# Patient Record
Sex: FEMALE | Race: WHITE | NOT HISPANIC OR LATINO | Employment: UNEMPLOYED | URBAN - METROPOLITAN AREA
[De-identification: names, ages, dates, MRNs, and addresses within clinical notes are randomized per-mention and may not be internally consistent; named-entity substitution may affect disease eponyms.]

---

## 2018-03-22 ENCOUNTER — OFFICE VISIT (OUTPATIENT)
Dept: ENDOCRINOLOGY | Facility: CLINIC | Age: 53
End: 2018-03-22
Payer: MEDICAID

## 2018-03-22 ENCOUNTER — TELEPHONE (OUTPATIENT)
Dept: ENDOCRINOLOGY | Facility: CLINIC | Age: 53
End: 2018-03-22

## 2018-03-22 VITALS
HEIGHT: 63 IN | DIASTOLIC BLOOD PRESSURE: 74 MMHG | SYSTOLIC BLOOD PRESSURE: 124 MMHG | WEIGHT: 203.5 LBS | BODY MASS INDEX: 36.06 KG/M2 | RESPIRATION RATE: 17 BRPM | HEART RATE: 78 BPM

## 2018-03-22 DIAGNOSIS — F41.9 ANXIETY: ICD-10-CM

## 2018-03-22 DIAGNOSIS — E66.9 OBESITY (BMI 35.0-39.9 WITHOUT COMORBIDITY): ICD-10-CM

## 2018-03-22 DIAGNOSIS — E89.40 SURGICAL MENOPAUSE: ICD-10-CM

## 2018-03-22 DIAGNOSIS — E06.3 HYPOTHYROIDISM DUE TO HASHIMOTO'S THYROIDITIS: Primary | ICD-10-CM

## 2018-03-22 DIAGNOSIS — E03.8 HYPOTHYROIDISM DUE TO HASHIMOTO'S THYROIDITIS: Primary | ICD-10-CM

## 2018-03-22 DIAGNOSIS — E03.8 HYPOTHYROIDISM DUE TO HASHIMOTO'S THYROIDITIS: ICD-10-CM

## 2018-03-22 DIAGNOSIS — E06.3 HYPOTHYROIDISM DUE TO HASHIMOTO'S THYROIDITIS: ICD-10-CM

## 2018-03-22 PROCEDURE — 99999 PR PBB SHADOW E&M-NEW PATIENT-LVL III: CPT | Mod: PBBFAC,,, | Performed by: INTERNAL MEDICINE

## 2018-03-22 PROCEDURE — 99204 OFFICE O/P NEW MOD 45 MIN: CPT | Mod: S$PBB,,, | Performed by: INTERNAL MEDICINE

## 2018-03-22 PROCEDURE — 99203 OFFICE O/P NEW LOW 30 MIN: CPT | Mod: PBBFAC | Performed by: INTERNAL MEDICINE

## 2018-03-22 RX ORDER — LEVOTHYROXINE SODIUM 112 UG/1
112 TABLET ORAL
Qty: 30 TABLET | Refills: 6 | Status: SHIPPED | OUTPATIENT
Start: 2018-03-22 | End: 2018-05-04 | Stop reason: DRUGHIGH

## 2018-03-22 RX ORDER — LEVOTHYROXINE SODIUM 100 UG/1
100 TABLET ORAL
Qty: 30 TABLET | Refills: 11
Start: 2018-03-22 | End: 2018-03-22 | Stop reason: DRUGHIGH

## 2018-03-22 NOTE — LETTER
March 22, 2018      Bindu Carver, NP  [address] [place] [facility] [place] [place] [address]           [facility]  [address]  [facility] [facility] [address]  Phone: [phone]  Fax: [phone]          Patient: Alexsandra Apple   MR Number: [id number]   YOB: 1965   Date of Visit: 3/22/2018       Dear Bindu Carver:    Thank you for referring Alexsandra Apple to me for evaluation. Attached you will find relevant portions of my assessment and plan of care.    If you have questions, please do not hesitate to call me. I look forward to following Alexsandra Apple along with you.    Sincerely,    Capri Wyatt, RANCHO    Enclosure  CC:  No Recipients    If you would like to receive this communication electronically, please contact [email or web address] or [phone] to request more information on [employer] access.    For providers and/or their staff who would like to refer a patient to [facility], please contact us through our one-stop-shop provider referral line, [facility], at [phone].    If you feel you have received this communication in error or would no longer like to receive these types of communications, please e-mail [email or web address]

## 2018-03-22 NOTE — PROGRESS NOTES
Chief Complaint: Consult      HPI:   Alexsandra Apple is 52 y.o. female with preexisting thyroid disease. Referred by [facility]     Found to abnormal TFTs on routine labs in 09/2017 and was found to be hypothyroid    Current Medications: Levothyroxine 100 mcg once daily     Patient reports good compliance with taking medication as prescribed   Takes LT4 separate from food and other medications first thing in the morning. Pt reports waiting 45mins - 1 hour before eating or taking other meds.     Current  Symptoms:   Reports feeling tired.   + Constipation   + cold intolerance   +weight gain of 30 pounds   Dry skin   Brittle nails and hair     Family history of thyroid disease: Yes, sister had thyroid cancer at age 16     Patient reports hysterectomy at age 18. Reports diagnosis ovarian cancer  Review of Systems   Constitutional: Positive for fatigue and unexpected weight change.   HENT: Positive for trouble swallowing. Negative for sore throat and voice change.    Eyes: Negative for visual disturbance.   Respiratory: Negative for cough and shortness of breath.    Cardiovascular: Positive for chest pain (related to anxiety ) and palpitations (occasional ).   Gastrointestinal: Positive for constipation. Negative for abdominal pain, diarrhea, nausea and vomiting.   Endocrine: Positive for cold intolerance. Negative for heat intolerance, polydipsia and polyuria.   Genitourinary: Negative for dysuria.   Musculoskeletal: Negative for neck pain and neck stiffness.   Skin: Negative for rash.   Allergic/Immunologic: Negative for immunocompromised state.   Neurological: Negative for tremors and headaches.   Hematological: Does not bruise/bleed easily.   Psychiatric/Behavioral: Positive for decreased concentration. The patient is nervous/anxious.      Physical Exam   Constitutional: She is oriented to person, place, and time. She appears well-developed. No distress.   HENT:   Right Ear: External ear normal.   Left Ear:  External ear normal.   Nose: Nose normal.   Hearing normal  Dentition good    Neck: No tracheal deviation present. Thyromegaly present.   Thyroid firm on palpation. Possible right thyroid nodule    Cardiovascular: Normal rate and regular rhythm.    No murmur heard.  Pulmonary/Chest: Effort normal and breath sounds normal.   Abdominal: Soft. There is no tenderness. No hernia.   Musculoskeletal: She exhibits no edema.   Gait normal  No clubbing or cyanosis noted   Lymphadenopathy:     She has no cervical adenopathy.   Neurological: She is alert and oriented to person, place, and time. She displays normal reflexes. No cranial nerve deficit.   Skin: Skin is warm and dry. No rash noted.   No nodules       Psychiatric: She has a normal mood and affect. Judgment normal.   Teary eyed    Nursing note and vitals reviewed.      LABS:   Outside labs from [facility]:   9/18/2017:   TSH - 81.7.00 ( 0.450-4.500)   T4 - 5.0 ug/dL ( 4.5-12.0)   T3 - 57 ng/dL ( )   Vitamin D - 23.9 ng/mL ( 30.0-100)     10/06/2017  TSH - 15.090 uIU/mL   Free T4 - 0.97 ( 0.82-1.77)   Thyroglobulin antibody - 920.2 IU/mL ( 0.0-0.9)     01/26/2018:   TSH - 7.690 uIU/mL ( 0.450-4.50 )   T4 - 4.6 ug/dL ( 4.5-12.0)   T3 ng/dL ( )       Assessment and Plan:  1. Hypothyroidism due to Hashimoto's thyroiditis  -- clinically and biochemically hypothyroid   -- goal of therapy is a normal TSH   -- check TFTs today   -- will adjust dosage accordingly   -- reviewed usual times of thyroid hormone changes   -- avoid exogenous hyperthyroidism as this can accelerate bone loss and increase risk of CV complications   -- obtain thyroid USS - will call with results   -     levothyroxine (SYNTHROID) 100 MCG tablet; Take 1 tablet (100 mcg total) by mouth before breakfast.  Dispense: 30 tablet; Refill: 11  -     TSH; Future; Expected date: 03/22/2018  -     Comprehensive metabolic panel; Future; Expected date: 03/22/2018  -     US Soft Tissue Head Neck Thyroid;  Future; Expected date: 03/22/2018    2. Anxiety  -- stable with current regimen   -- followed by outside PCP     3. Obesity (BMI 35.0-39.9 without comorbidity)  -- alerted as to the increased risk of T2DM   -- check A1c   -- encouraged dietary and lifestyle modifications   -- increase exercise as tolerated   -     Hemoglobin A1c; Future; Expected date: 03/22/2018    4. Surgical menopause  -- will check BMD as patient had a hysterectomy at age 18  -- replete vitamin D   -- start over the counter vitamin D3 - 2,000 IU's once daily   -- calcium from food sources are preferred   -     DXA Bone Density Spine And Hip; Future; Expected date: 03/22/2018          Case discussed in consultation with Dr. Olsen   Recommendations were discussed with the patient in detail  The patient verbalized understanding and agrees with the plan outlined as above.

## 2018-03-22 NOTE — TELEPHONE ENCOUNTER
Patient notified of test results. She did not answer. I recorded a voicemail message informing the patient of test results. TSH is elevated. Will increase Levothyroxine to 112 mcg once daily and will check labs again in 4-6 weeks   Will send new Rx to pharmacy on file. Office number provided

## 2018-03-23 NOTE — TELEPHONE ENCOUNTER
TSH lab scheduled for 5/4/18 as ordered per Capri Wyatt NP- appointment reminder letter mailed to address on file.

## 2018-04-03 ENCOUNTER — TELEPHONE (OUTPATIENT)
Dept: ENDOCRINOLOGY | Facility: CLINIC | Age: 53
End: 2018-04-03

## 2018-04-03 ENCOUNTER — HOSPITAL ENCOUNTER (OUTPATIENT)
Dept: RADIOLOGY | Facility: HOSPITAL | Age: 53
Discharge: HOME | End: 2018-04-03
Attending: INTERNAL MEDICINE
Payer: MEDICAID

## 2018-04-03 ENCOUNTER — HOSPITAL ENCOUNTER (OUTPATIENT)
Dept: RADIOLOGY | Facility: CLINIC | Age: 53
Discharge: HOME | End: 2018-04-03
Attending: INTERNAL MEDICINE
Payer: MEDICAID

## 2018-04-03 DIAGNOSIS — E06.3 HYPOTHYROIDISM DUE TO HASHIMOTO'S THYROIDITIS: ICD-10-CM

## 2018-04-03 DIAGNOSIS — E89.40 SURGICAL MENOPAUSE: ICD-10-CM

## 2018-04-03 DIAGNOSIS — E03.8 HYPOTHYROIDISM DUE TO HASHIMOTO'S THYROIDITIS: ICD-10-CM

## 2018-04-03 PROCEDURE — 77080 DXA BONE DENSITY AXIAL: CPT | Mod: 26,,, | Performed by: INTERNAL MEDICINE

## 2018-04-03 PROCEDURE — 76536 US EXAM OF HEAD AND NECK: CPT | Mod: TC

## 2018-04-03 PROCEDURE — 77080 DXA BONE DENSITY AXIAL: CPT | Mod: TC

## 2018-04-03 PROCEDURE — 76536 US EXAM OF HEAD AND NECK: CPT | Mod: 26,,, | Performed by: RADIOLOGY

## 2018-04-13 ENCOUNTER — TELEPHONE (OUTPATIENT)
Dept: ENDOCRINOLOGY | Facility: CLINIC | Age: 53
End: 2018-04-13

## 2018-04-17 ENCOUNTER — TELEPHONE (OUTPATIENT)
Dept: ENDOCRINOLOGY | Facility: CLINIC | Age: 53
End: 2018-04-17

## 2018-04-17 NOTE — TELEPHONE ENCOUNTER
----- Message from Sangeetha Roche sent at 4/16/2018 10:59 AM CDT -----  Contact: Self  Requesting info of her lab work on 4/3.

## 2018-04-20 ENCOUNTER — TELEPHONE (OUTPATIENT)
Dept: ENDOCRINOLOGY | Facility: CLINIC | Age: 53
End: 2018-04-20

## 2018-04-20 DIAGNOSIS — E03.8 HYPOTHYROIDISM DUE TO HASHIMOTO'S THYROIDITIS: Primary | ICD-10-CM

## 2018-04-20 DIAGNOSIS — E06.3 HYPOTHYROIDISM DUE TO HASHIMOTO'S THYROIDITIS: Primary | ICD-10-CM

## 2018-04-20 NOTE — TELEPHONE ENCOUNTER
Patient notified via phone. She did not answer again. I recorded a voicmail message with TSH results, thyroid USS and BMD results. Informed that we increased Levothyroxine to 112 mcg and that I sent in a new Rx to her pharmacy on file in March 2018. Patient advised to call back to arrange repeat labs. Office number provided

## 2018-04-20 NOTE — TELEPHONE ENCOUNTER
----- Message from Mikayla Ibarra sent at 4/20/2018  8:54 AM CDT -----  Contact: Self [phone]   PT called for test results. pls leave message if no answer.

## 2018-05-04 ENCOUNTER — LAB VISIT (OUTPATIENT)
Dept: LAB | Facility: HOSPITAL | Age: 53
End: 2018-05-04
Payer: MEDICAID

## 2018-05-04 ENCOUNTER — TELEPHONE (OUTPATIENT)
Dept: ENDOCRINOLOGY | Facility: CLINIC | Age: 53
End: 2018-05-04

## 2018-05-04 DIAGNOSIS — E06.3 HYPOTHYROIDISM DUE TO HASHIMOTO'S THYROIDITIS: ICD-10-CM

## 2018-05-04 DIAGNOSIS — E06.3 HYPOTHYROIDISM DUE TO HASHIMOTO'S THYROIDITIS: Primary | ICD-10-CM

## 2018-05-04 DIAGNOSIS — E03.8 HYPOTHYROIDISM DUE TO HASHIMOTO'S THYROIDITIS: ICD-10-CM

## 2018-05-04 DIAGNOSIS — E03.8 HYPOTHYROIDISM DUE TO HASHIMOTO'S THYROIDITIS: Primary | ICD-10-CM

## 2018-05-04 LAB
T4 FREE SERPL-MCNC: 1.06 NG/DL
TSH SERPL DL<=0.005 MIU/L-ACNC: 15.93 UIU/ML

## 2018-05-04 PROCEDURE — 84439 ASSAY OF FREE THYROXINE: CPT

## 2018-05-04 PROCEDURE — 36415 COLL VENOUS BLD VENIPUNCTURE: CPT

## 2018-05-04 PROCEDURE — 84443 ASSAY THYROID STIM HORMONE: CPT

## 2018-05-04 RX ORDER — LEVOTHYROXINE SODIUM 125 UG/1
125 TABLET ORAL
Qty: 30 TABLET | Refills: 6 | Status: SHIPPED | OUTPATIENT
Start: 2018-05-04 | End: 2018-11-26 | Stop reason: SDUPTHER

## 2018-05-04 NOTE — TELEPHONE ENCOUNTER
----- Message from Mikayla Ibarra sent at 5/4/2018  3:43 PM CDT -----  Contact: Self [phone]  PT returned call.     She is taking levothyroxine (SYNTHROID) 112 MCG tablet & a half.

## 2018-05-24 ENCOUNTER — TELEPHONE (OUTPATIENT)
Dept: ENDOCRINOLOGY | Facility: CLINIC | Age: 53
End: 2018-05-24

## 2018-05-24 NOTE — TELEPHONE ENCOUNTER
----- Message from Jennifer Moss MA sent at 5/24/2018  3:36 PM CDT -----  Contact: Alexsandra   tel:   [phone]      ----- Message -----  From: Maria Guadalupe Jenni  Sent: 5/24/2018   3:25 PM  To: Edmundo Farooq Staff    Pt.says she needs to speak to Ms. Edmundo ref. Her pcp told her to call you and get another test ordered when she has her labs on 6/20, cortisol challenge.    Pls call ref. This.

## 2018-05-25 ENCOUNTER — TELEPHONE (OUTPATIENT)
Dept: ENDOCRINOLOGY | Facility: CLINIC | Age: 53
End: 2018-05-25

## 2018-05-25 NOTE — TELEPHONE ENCOUNTER
Left voicemail message that Capri would like for her to schedule a follow up visit to discuss in detail. Provided call back number

## 2018-05-25 NOTE — TELEPHONE ENCOUNTER
----- Message from Sangeetha Roche sent at 5/25/2018 10:33 AM CDT -----  Contact: Self- [phone]  Pt is returning your call about an appt.

## 2018-06-04 ENCOUNTER — OFFICE VISIT (OUTPATIENT)
Dept: ENDOCRINOLOGY | Facility: CLINIC | Age: 53
End: 2018-06-04
Payer: MEDICAID

## 2018-06-04 VITALS
HEART RATE: 78 BPM | DIASTOLIC BLOOD PRESSURE: 74 MMHG | WEIGHT: 195.31 LBS | SYSTOLIC BLOOD PRESSURE: 121 MMHG | HEIGHT: 63 IN | BODY MASS INDEX: 34.61 KG/M2

## 2018-06-04 DIAGNOSIS — E03.8 HYPOTHYROIDISM DUE TO HASHIMOTO'S THYROIDITIS: Primary | ICD-10-CM

## 2018-06-04 DIAGNOSIS — E66.9 OBESITY (BMI 35.0-39.9 WITHOUT COMORBIDITY): ICD-10-CM

## 2018-06-04 DIAGNOSIS — E06.3 HYPOTHYROIDISM DUE TO HASHIMOTO'S THYROIDITIS: Primary | ICD-10-CM

## 2018-06-04 DIAGNOSIS — F33.0 MILD EPISODE OF RECURRENT MAJOR DEPRESSIVE DISORDER: ICD-10-CM

## 2018-06-04 DIAGNOSIS — M85.89 OSTEOPENIA OF MULTIPLE SITES: ICD-10-CM

## 2018-06-04 DIAGNOSIS — M79.2 NERVE PAIN: ICD-10-CM

## 2018-06-04 DIAGNOSIS — R53.82 CHRONIC FATIGUE: ICD-10-CM

## 2018-06-04 PROBLEM — F33.9 RECURRENT MAJOR DEPRESSIVE DISORDER: Status: ACTIVE | Noted: 2018-06-04

## 2018-06-04 PROBLEM — R53.83 FATIGUE: Status: ACTIVE | Noted: 2018-06-04

## 2018-06-04 PROCEDURE — 99999 PR PBB SHADOW E&M-EST. PATIENT-LVL III: CPT | Mod: PBBFAC,,, | Performed by: INTERNAL MEDICINE

## 2018-06-04 PROCEDURE — 99214 OFFICE O/P EST MOD 30 MIN: CPT | Mod: S$PBB,,, | Performed by: INTERNAL MEDICINE

## 2018-06-04 PROCEDURE — 99213 OFFICE O/P EST LOW 20 MIN: CPT | Mod: PBBFAC | Performed by: INTERNAL MEDICINE

## 2018-06-04 RX ORDER — ARIPIPRAZOLE 5 MG/1
5 TABLET ORAL DAILY
Qty: 30 TABLET | Refills: 0 | Status: SHIPPED | OUTPATIENT
Start: 2018-06-04 | End: 2019-06-04

## 2018-06-04 RX ORDER — GABAPENTIN 600 MG/1
600 TABLET ORAL 3 TIMES DAILY
Qty: 90 TABLET | Refills: 0 | Status: SHIPPED | OUTPATIENT
Start: 2018-06-04 | End: 2019-06-04

## 2018-06-04 NOTE — PROGRESS NOTES
Chief Complaint: Hypothyroidism       HPI:   Alexsandra Apple is 52 y.o. female with preexisting thyroid disease. Referred by [facility]. She is here today for routine follow up visit     Found to abnormal TFTs on routine labs in 09/2017 and was found to be hypothyroid due to Hashimoto's thyroiditis     In May 2018, we increased Levothyroxine to 125 mcg once daily due to an elevated TSH     Current regimen:   Levothyroxine 125 mcg once daily     Patient endorses compliance with taking medication as prescribed   Takes LT4 separate from food and other medications first thing in the morning. Pt reports waiting 45mins - 1 hour before eating or taking other meds.     She reports that her father passed away on May 31, 2018 and she is just returning from [place]     She continues to endorse fatigue and dry skin     Weight is steady     She denies cold  Intolerance or constipation at this time     Family history of thyroid disease: Yes, sister had thyroid cancer at age 16   Thyroid USS from 04/2018 :   Impression     Findings are consistent with acute on chronic thyroiditis.     No nodules or masses identified       Patient reports hysterectomy at age 18. Reports diagnosis ovarian cancer  BMD from 04/2018 noted low bone mass of the femoral neck and lumbar spine   FRAX calculations do not suggest treatment     She is taking vitamin D   Denies recent falls or fractures     She quit smoking 2-3 months ago   She has history of asthma   Reports at least 2 inches of height loss since age 26     Review of Systems   Constitutional: Positive for fatigue. Negative for unexpected weight change.   HENT: Positive for trouble swallowing. Negative for sore throat and voice change.    Eyes: Negative for visual disturbance.   Respiratory: Negative for cough and shortness of breath.    Cardiovascular: Positive for chest pain (related to anxiety ). Negative for palpitations.   Gastrointestinal: Negative for abdominal pain,  constipation, diarrhea, nausea and vomiting.   Endocrine: Negative for cold intolerance, heat intolerance, polydipsia and polyuria.   Genitourinary: Negative for dysuria.   Musculoskeletal: Negative for neck pain and neck stiffness.   Skin: Negative for rash.   Allergic/Immunologic: Negative for immunocompromised state.   Neurological: Negative for tremors and headaches.   Hematological: Does not bruise/bleed easily.   Psychiatric/Behavioral: Positive for decreased concentration. The patient is nervous/anxious.      Physical Exam   Constitutional: She is oriented to person, place, and time. She appears well-developed. No distress.   Neck: No tracheal deviation present. No thyromegaly present.   Thyroid firm on palpation.    Cardiovascular: Normal rate.    No murmur heard.  Pulmonary/Chest: Effort normal.   Abdominal: Soft. There is no tenderness. No hernia.   Musculoskeletal: She exhibits no edema.   Lymphadenopathy:     She has no cervical adenopathy.   Neurological: She is alert and oriented to person, place, and time. She displays normal reflexes. No cranial nerve deficit.   Skin: Skin is warm and dry. No rash noted.   No nodules       Psychiatric: She has a normal mood and affect.   Teary eyed    Nursing note and vitals reviewed.      LABS:   Results for LOUIS IBARRA (MRN [id number]) as of 6/4/2018 09:58   Ref. Range 3/22/2018 09:22 4/3/2018 13:55 4/3/2018 14:32 5/4/2018 07:44   TSH Latest Ref Range: 0.400 - 4.000 uIU/mL 23.399 (H)   15.930 (H)   Free T4 Latest Ref Range: 0.71 - 1.51 ng/dL 0.70 (L)   1.06     Results for LOUIS IBARRA (MRN [id number]) as of 6/4/2018 07:50   Ref. Range 3/22/2018 09:22   Hemoglobin A1C Latest Ref Range: 4.0 - 5.6 % 4.9   Estimated Avg Glucose Latest Ref Range: 68 - 131 mg/dL 94     Results for LOUIS IBARRA (MRN [id number]) as of 6/4/2018 07:50   Ref. Range 3/22/2018 09:22   Sodium Latest Ref Range: 136 - 145 mmol/L 132 (L)   Potassium Latest Ref Range: 3.5 - 5.1 mmol/L 5.1    Chloride Latest Ref Range: 95 - 110 mmol/L 97   CO2 Latest Ref Range: 23 - 29 mmol/L 28   Anion Gap Latest Ref Range: 8 - 16 mmol/L 7 (L)   BUN, Bld Latest Ref Range: 6 - 20 mg/dL 11   Creatinine Latest Ref Range: 0.5 - 1.4 mg/dL 0.7   eGFR if non African American Latest Ref Range: >60 mL/min/1.73 m^2 >60.0   eGFR if African American Latest Ref Range: >60 mL/min/1.73 m^2 >60.0   Glucose Latest Ref Range: 70 - 110 mg/dL 101   Calcium Latest Ref Range: 8.7 - 10.5 mg/dL 9.4   Alkaline Phosphatase Latest Ref Range: 55 - 135 U/L 100   Total Protein Latest Ref Range: 6.0 - 8.4 g/dL 6.9   Albumin Latest Ref Range: 3.5 - 5.2 g/dL 4.0   Total Bilirubin Latest Ref Range: 0.1 - 1.0 mg/dL 0.2   AST Latest Ref Range: 10 - 40 U/L 15   ALT Latest Ref Range: 10 - 44 U/L 9 (L)     Outside labs from [facility]:   9/18/2017:   TSH - 81.7.00 ( 0.450-4.500)   T4 - 5.0 ug/dL ( 4.5-12.0)   T3 - 57 ng/dL ( )   Vitamin D - 23.9 ng/mL ( 30.0-100)     10/06/2017  TSH - 15.090 uIU/mL   Free T4 - 0.97 ( 0.82-1.77)   Thyroglobulin antibody - 920.2 IU/mL ( 0.0-0.9)     01/26/2018:   TSH - 7.690 uIU/mL ( 0.450-4.50 )   T4 - 4.6 ug/dL ( 4.5-12.0)   T3 ng/dL ( )       Assessment and Plan:  1. Hypothyroidism due to Hashimoto's thyroiditis  -- clinically and biochemically hypothyroid   -- continue Levothyroxine 125 mcg once daily   -- repeat labs scheduled for June 20, 2018   -- goal of therapy is a normal TSH   -- reviewed usual times of thyroid hormone changes   -- avoid exogenous hyperthyroidism as this can accelerate bone loss and increase risk of CV complications   -- will check 8am cortisol as patient continues to endorse chronic fatigue     2. Anxiety/Depression   -- stable with current regimen   -- followed by outside PCP   -- refer to Psychiatry for further evaluation   -- if [facility] does not accept patient's insurance - will refer to [facility] in [place]     3. Obesity (BMI 35.0-39.9 without comorbidity)  --  alerted as to the increased risk of T2DM   -- recommend periodic A1c   -- encouraged dietary and lifestyle modifications   -- increase exercise as tolerated     4. Osteopenia   -- risk factors include: surgical menopause, tobacco use   -- reassuring she is not fracturing   -- recommend repeat BMD in 04/2020  -- RDA of calcium and vitamin D, calcium from food sources are preferred   -- continue fall safety and fall precautions   -- encouraged weight bearing exercises as tolerated

## 2018-06-20 ENCOUNTER — HOSPITAL ENCOUNTER (EMERGENCY)
Facility: HOSPITAL | Age: 53
Discharge: HOME | End: 2018-06-20
Attending: FAMILY MEDICINE
Payer: MEDICAID

## 2018-06-20 VITALS
RESPIRATION RATE: 18 BRPM | DIASTOLIC BLOOD PRESSURE: 58 MMHG | HEART RATE: 87 BPM | HEIGHT: 64 IN | SYSTOLIC BLOOD PRESSURE: 126 MMHG | BODY MASS INDEX: 30.73 KG/M2 | TEMPERATURE: 99 F | WEIGHT: 180 LBS | OXYGEN SATURATION: 95 %

## 2018-06-20 DIAGNOSIS — R09.89 CHEST CONGESTION: ICD-10-CM

## 2018-06-20 DIAGNOSIS — J40 BRONCHITIS: Primary | ICD-10-CM

## 2018-06-20 PROCEDURE — 99283 EMERGENCY DEPT VISIT LOW MDM: CPT | Mod: 25

## 2018-06-20 PROCEDURE — 99284 EMERGENCY DEPT VISIT MOD MDM: CPT | Mod: ,,, | Performed by: PHYSICIAN ASSISTANT

## 2018-06-20 RX ORDER — AZITHROMYCIN 250 MG/1
250 TABLET, FILM COATED ORAL DAILY
Qty: 6 TABLET | Refills: 0 | Status: SHIPPED | OUTPATIENT
Start: 2018-06-20

## 2018-06-20 RX ORDER — METHYLPREDNISOLONE 4 MG/1
TABLET ORAL
Qty: 1 PACKAGE | Refills: 0 | Status: SHIPPED | OUTPATIENT
Start: 2018-06-20 | End: 2018-07-11

## 2018-06-20 NOTE — ED NOTES
LOC: The patient is awake and alert; oriented x 3 and speaking appropriately.  APPEARANCE: Patient resting comfortably, patient is clean and well groomed  SKIN: warm and dry, normal skin turgor & moist mucus membranes, skin intact, no breakdown noted.  MUSCULOSKELETAL: Patient moving all extremities well, no obvious swelling or deformities noted  RESPIRATORY: Airway is open and patent, breath sounds with expiratory wheezing; respirations are spontaneous, normal effort and rate, has nasal congestion and  cough  CARDIAC: Patient has a normal rate, no peripheral edema noted, capillary refill < 3 seconds; No complaints of chest pain   ABDOMEN: Soft and deneis abd pain

## 2018-06-22 ENCOUNTER — LAB VISIT (OUTPATIENT)
Dept: LAB | Facility: HOSPITAL | Age: 53
End: 2018-06-22
Attending: INTERNAL MEDICINE
Payer: MEDICAID

## 2018-06-22 DIAGNOSIS — R53.82 CHRONIC FATIGUE: ICD-10-CM

## 2018-06-22 DIAGNOSIS — E06.3 HYPOTHYROIDISM DUE TO HASHIMOTO'S THYROIDITIS: ICD-10-CM

## 2018-06-22 DIAGNOSIS — E03.8 HYPOTHYROIDISM DUE TO HASHIMOTO'S THYROIDITIS: ICD-10-CM

## 2018-06-22 LAB
CORTIS SERPL-MCNC: <1 UG/DL
TSH SERPL DL<=0.005 MIU/L-ACNC: 0.65 UIU/ML

## 2018-06-22 PROCEDURE — 84443 ASSAY THYROID STIM HORMONE: CPT

## 2018-06-22 PROCEDURE — 82533 TOTAL CORTISOL: CPT

## 2018-06-22 PROCEDURE — 36415 COLL VENOUS BLD VENIPUNCTURE: CPT

## 2018-06-25 ENCOUNTER — TELEPHONE (OUTPATIENT)
Dept: ENDOCRINOLOGY | Facility: CLINIC | Age: 53
End: 2018-06-25

## 2018-11-26 DIAGNOSIS — E06.3 HYPOTHYROIDISM DUE TO HASHIMOTO'S THYROIDITIS: ICD-10-CM

## 2018-11-26 DIAGNOSIS — E03.8 HYPOTHYROIDISM DUE TO HASHIMOTO'S THYROIDITIS: ICD-10-CM

## 2018-11-27 RX ORDER — LEVOTHYROXINE SODIUM 125 UG/1
125 TABLET ORAL
Qty: 30 TABLET | Refills: 6 | Status: SHIPPED | OUTPATIENT
Start: 2018-11-27 | End: 2019-11-27

## 2019-05-24 ENCOUNTER — HOSPITAL ENCOUNTER (EMERGENCY)
Facility: HOSPITAL | Age: 54
Discharge: PSYCHIATRIC OR SUBSTANCE USE FACILITY | End: 2019-05-24
Attending: EMERGENCY MEDICINE
Payer: MEDICAID

## 2019-05-24 VITALS
TEMPERATURE: 98 F | RESPIRATION RATE: 18 BRPM | WEIGHT: 213 LBS | HEART RATE: 91 BPM | HEIGHT: 65 IN | SYSTOLIC BLOOD PRESSURE: 127 MMHG | OXYGEN SATURATION: 96 % | BODY MASS INDEX: 35.49 KG/M2 | DIASTOLIC BLOOD PRESSURE: 76 MMHG

## 2019-05-24 DIAGNOSIS — F32.A DEPRESSION, UNSPECIFIED DEPRESSION TYPE: ICD-10-CM

## 2019-05-24 DIAGNOSIS — R45.851 SUICIDAL IDEATION: Primary | ICD-10-CM

## 2019-05-24 DIAGNOSIS — N39.0 URINARY TRACT INFECTION WITHOUT HEMATURIA, SITE UNSPECIFIED: ICD-10-CM

## 2019-05-24 DIAGNOSIS — F13.930 BENZODIAZEPINE WITHDRAWAL WITHOUT COMPLICATION: ICD-10-CM

## 2019-05-24 LAB
ALBUMIN SERPL BCP-MCNC: 4.4 G/DL (ref 3.5–5.2)
ALP SERPL-CCNC: 85 U/L (ref 55–135)
ALT SERPL W/O P-5'-P-CCNC: 13 U/L (ref 10–44)
AMPHET+METHAMPHET UR QL: NEGATIVE
ANION GAP SERPL CALC-SCNC: 12 MMOL/L (ref 8–16)
APAP SERPL-MCNC: <3 UG/ML (ref 10–20)
AST SERPL-CCNC: 14 U/L (ref 10–40)
B-HCG UR QL: NEGATIVE
BACTERIA #/AREA URNS AUTO: ABNORMAL /HPF
BARBITURATES UR QL SCN>200 NG/ML: NEGATIVE
BASOPHILS # BLD AUTO: 0.07 K/UL (ref 0–0.2)
BASOPHILS NFR BLD: 0.6 % (ref 0–1.9)
BENZODIAZ UR QL SCN>200 NG/ML: NEGATIVE
BILIRUB SERPL-MCNC: 0.3 MG/DL (ref 0.1–1)
BILIRUB UR QL STRIP: NEGATIVE
BUN SERPL-MCNC: 10 MG/DL (ref 6–20)
BZE UR QL SCN: NEGATIVE
CALCIUM SERPL-MCNC: 10.8 MG/DL (ref 8.7–10.5)
CANNABINOIDS UR QL SCN: NORMAL
CHLORIDE SERPL-SCNC: 104 MMOL/L (ref 95–110)
CLARITY UR REFRACT.AUTO: CLEAR
CO2 SERPL-SCNC: 21 MMOL/L (ref 23–29)
COLOR UR AUTO: YELLOW
CREAT SERPL-MCNC: 0.8 MG/DL (ref 0.5–1.4)
CREAT UR-MCNC: 106 MG/DL (ref 15–325)
CTP QC/QA: YES
DIFFERENTIAL METHOD: ABNORMAL
EOSINOPHIL # BLD AUTO: 0 K/UL (ref 0–0.5)
EOSINOPHIL NFR BLD: 0.1 % (ref 0–8)
ERYTHROCYTE [DISTWIDTH] IN BLOOD BY AUTOMATED COUNT: 12.2 % (ref 11.5–14.5)
EST. GFR  (AFRICAN AMERICAN): >60 ML/MIN/1.73 M^2
EST. GFR  (NON AFRICAN AMERICAN): >60 ML/MIN/1.73 M^2
ETHANOL SERPL-MCNC: <10 MG/DL
GLUCOSE SERPL-MCNC: 117 MG/DL (ref 70–110)
GLUCOSE UR QL STRIP: NEGATIVE
HCT VFR BLD AUTO: 42.5 % (ref 37–48.5)
HGB BLD-MCNC: 14.2 G/DL (ref 12–16)
HGB UR QL STRIP: NEGATIVE
IMM GRANULOCYTES # BLD AUTO: 0.03 K/UL (ref 0–0.04)
IMM GRANULOCYTES NFR BLD AUTO: 0.3 % (ref 0–0.5)
KETONES UR QL STRIP: NEGATIVE
LEUKOCYTE ESTERASE UR QL STRIP: ABNORMAL
LIPASE SERPL-CCNC: 9 U/L (ref 4–60)
LYMPHOCYTES # BLD AUTO: 1 K/UL (ref 1–4.8)
LYMPHOCYTES NFR BLD: 9.4 % (ref 18–48)
MCH RBC QN AUTO: 28.9 PG (ref 27–31)
MCHC RBC AUTO-ENTMCNC: 33.4 G/DL (ref 32–36)
MCV RBC AUTO: 87 FL (ref 82–98)
METHADONE UR QL SCN>300 NG/ML: NEGATIVE
MICROSCOPIC COMMENT: ABNORMAL
MONOCYTES # BLD AUTO: 0.3 K/UL (ref 0.3–1)
MONOCYTES NFR BLD: 2.8 % (ref 4–15)
NEUTROPHILS # BLD AUTO: 9.7 K/UL (ref 1.8–7.7)
NEUTROPHILS NFR BLD: 86.8 % (ref 38–73)
NITRITE UR QL STRIP: NEGATIVE
NRBC BLD-RTO: 0 /100 WBC
OPIATES UR QL SCN: NEGATIVE
PCP UR QL SCN>25 NG/ML: NEGATIVE
PH UR STRIP: 7 [PH] (ref 5–8)
PLATELET # BLD AUTO: 422 K/UL (ref 150–350)
PMV BLD AUTO: 9.9 FL (ref 9.2–12.9)
POTASSIUM SERPL-SCNC: 3.9 MMOL/L (ref 3.5–5.1)
PROT SERPL-MCNC: 8.1 G/DL (ref 6–8.4)
PROT UR QL STRIP: NEGATIVE
RBC # BLD AUTO: 4.91 M/UL (ref 4–5.4)
RBC #/AREA URNS AUTO: 1 /HPF (ref 0–4)
SALICYLATES SERPL-MCNC: <5 MG/DL (ref 15–30)
SODIUM SERPL-SCNC: 137 MMOL/L (ref 136–145)
SP GR UR STRIP: 1.01 (ref 1–1.03)
SQUAMOUS #/AREA URNS AUTO: 2 /HPF
T4 FREE SERPL-MCNC: 1.03 NG/DL (ref 0.71–1.51)
TOXICOLOGY INFORMATION: NORMAL
TSH SERPL DL<=0.005 MIU/L-ACNC: 0.19 UIU/ML (ref 0.4–4)
URN SPEC COLLECT METH UR: ABNORMAL
WBC # BLD AUTO: 11.11 K/UL (ref 3.9–12.7)
WBC #/AREA URNS AUTO: 27 /HPF (ref 0–5)

## 2019-05-24 PROCEDURE — 25000003 PHARM REV CODE 250: Performed by: EMERGENCY MEDICINE

## 2019-05-24 PROCEDURE — 84439 ASSAY OF FREE THYROXINE: CPT

## 2019-05-24 PROCEDURE — 80053 COMPREHEN METABOLIC PANEL: CPT

## 2019-05-24 PROCEDURE — 85025 COMPLETE CBC W/AUTO DIFF WBC: CPT

## 2019-05-24 PROCEDURE — 84443 ASSAY THYROID STIM HORMONE: CPT

## 2019-05-24 PROCEDURE — 81025 URINE PREGNANCY TEST: CPT | Performed by: EMERGENCY MEDICINE

## 2019-05-24 PROCEDURE — 80320 DRUG SCREEN QUANTALCOHOLS: CPT

## 2019-05-24 PROCEDURE — 80307 DRUG TEST PRSMV CHEM ANLYZR: CPT

## 2019-05-24 PROCEDURE — 87086 URINE CULTURE/COLONY COUNT: CPT

## 2019-05-24 PROCEDURE — 80329 ANALGESICS NON-OPIOID 1 OR 2: CPT

## 2019-05-24 PROCEDURE — 99285 EMERGENCY DEPT VISIT HI MDM: CPT | Mod: ,,, | Performed by: EMERGENCY MEDICINE

## 2019-05-24 PROCEDURE — 99285 PR EMERGENCY DEPT VISIT,LEVEL V: ICD-10-PCS | Mod: ,,, | Performed by: EMERGENCY MEDICINE

## 2019-05-24 PROCEDURE — 83690 ASSAY OF LIPASE: CPT

## 2019-05-24 PROCEDURE — 99285 EMERGENCY DEPT VISIT HI MDM: CPT

## 2019-05-24 PROCEDURE — 81001 URINALYSIS AUTO W/SCOPE: CPT

## 2019-05-24 RX ORDER — GABAPENTIN 300 MG/1
600 CAPSULE ORAL
Status: COMPLETED | OUTPATIENT
Start: 2019-05-24 | End: 2019-05-24

## 2019-05-24 RX ORDER — GABAPENTIN 400 MG/1
800 CAPSULE ORAL
Status: COMPLETED | OUTPATIENT
Start: 2019-05-24 | End: 2019-05-24

## 2019-05-24 RX ORDER — NITROFURANTOIN 25; 75 MG/1; MG/1
100 CAPSULE ORAL
Status: COMPLETED | OUTPATIENT
Start: 2019-05-24 | End: 2019-05-24

## 2019-05-24 RX ORDER — CLONAZEPAM 0.5 MG/1
2 TABLET ORAL
Status: COMPLETED | OUTPATIENT
Start: 2019-05-24 | End: 2019-05-24

## 2019-05-24 RX ORDER — ACETAMINOPHEN 500 MG
1000 TABLET ORAL
Status: COMPLETED | OUTPATIENT
Start: 2019-05-24 | End: 2019-05-24

## 2019-05-24 RX ORDER — NITROFURANTOIN 25; 75 MG/1; MG/1
100 CAPSULE ORAL 2 TIMES DAILY
Qty: 10 CAPSULE | Refills: 0 | Status: SHIPPED | OUTPATIENT
Start: 2019-05-24 | End: 2019-05-29

## 2019-05-24 RX ADMIN — NITROFURANTOIN (MONOHYDRATE/MACROCRYSTALS) 100 MG: 75; 25 CAPSULE ORAL at 05:05

## 2019-05-24 RX ADMIN — GABAPENTIN 600 MG: 300 CAPSULE ORAL at 11:05

## 2019-05-24 RX ADMIN — GABAPENTIN 800 MG: 400 CAPSULE ORAL at 05:05

## 2019-05-24 RX ADMIN — LEVOTHYROXINE SODIUM 125 MCG: 75 TABLET ORAL at 10:05

## 2019-05-24 RX ADMIN — CLONAZEPAM 2 MG: 0.5 TABLET ORAL at 05:05

## 2019-05-24 RX ADMIN — ACETAMINOPHEN 1000 MG: 500 TABLET ORAL at 07:05

## 2019-05-24 NOTE — ED NOTES
Pt transferred to [facility] via [facility]. Original PEC document and all personal belongings given to transport team. Client mood is calm, cooperative, and accepting of transfer. Pt declined to have this RN notify next of kin of transfer; she elected to use portable phone to call family herself.

## 2019-05-24 NOTE — ED NOTES
Pt ambulated via wheelchair from ED to [facility], escorted by ED staff and . All belongings brought with pt to [facility] and placed in secured belongings closet. Original  PEC document placed in pt's chart.  [facility] notified of pt's transfer to [facility]. Pt is calm and cooperative.Pt declined to have next of kin notified of transfer to [facility]. Pt was educated re: PEC status. Pt is dressed in blue paper scrubs. Resting comfortably in BH1. Respirations even and unlabored. NAD observed. Will cont to monitor.

## 2019-05-24 NOTE — ED NOTES
Report received from BRENTON Mendez.  Patient is up to the restroom.  Patient is complaining of headache

## 2019-05-24 NOTE — ED NOTES
Patient accepted by [facility] at [facility] ([address] [place], [place]) for the service of Dr. Britton.  Report to be called to [phone] ext [phone].

## 2019-05-24 NOTE — ED NOTES
Faxed clinical packet to [[facility]] [facility], [facility] ([employer]), [facility], [facility] (Central Intake), [facility] (Central Intake), [[facility]] [facility], [facility]. Awaiting response at this time.

## 2019-05-24 NOTE — ED NOTES
CPT staff actively seeking psych placement. Faxed clinical packet to [facility], [facility], [facility] ([facility]), & [facility] ([facility]). Awaiting response at this time.

## 2019-05-24 NOTE — ED PROVIDER NOTES
"Encounter Date: 5/24/2019    SCRIBE #1 NOTE: I, Romel Phelps, am scribing for, and in the presence of,  Dr. Key. I have scribed the entire note.       History     Chief Complaint   Patient presents with    Depression     Pt reports w/ c/o depression, SI, and abd pain. Pt reports she's been off her meds. Pt denies current plan at this time. Tearful in triage     Time patient was seen by the provider: 4:56 AM      The patient is a 53 y.o. female with co-morbidities including: asthma, PE, and Hashimoto's disease, who presents to the ED with a complaint of depression and suicidal ideation. Patient reports she has been depressed lately. She states she stopped taking her Abilify and klonopin 2 days ago and now she thinks she is going through withdrawals. She reports tremors and feeling anxious. She states, "I really need help, it feels like I am sinking." She endorses suicidal ideation but denies having a plan. Patient reports her last inpatient psychiatric admission was about 2 years ago.     The history is provided by the patient.     Review of patient's allergies indicates:   Allergen Reactions    Compazine [prochlorperazine edisylate]     Phenergan plain      Past Medical History:   Diagnosis Date    Asthma     Broken femur     Hashimoto's disease     Pulmonary embolism      Past Surgical History:   Procedure Laterality Date    EYE SURGERY      FRACTURE SURGERY      HYSTERECTOMY      KNEE SURGERY       No family history on file.  Social History     Tobacco Use    Smoking status: Former Smoker     Types: Cigarettes    Smokeless tobacco: Never Used   Substance Use Topics    Alcohol use: No    Drug use: No     Review of Systems   Constitutional: Negative for fever.   HENT: Negative for sore throat.    Eyes: Negative for visual disturbance.   Respiratory: Negative for shortness of breath.    Cardiovascular: Negative for chest pain.   Gastrointestinal: Negative for abdominal pain.   Genitourinary: " Negative for dysuria.   Musculoskeletal: Negative for back pain.   Skin: Negative for rash.   Neurological: Positive for tremors. Negative for weakness.   Psychiatric/Behavioral: Positive for suicidal ideas.        Positive for anxiousness.        Physical Exam     Initial Vitals [05/24/19 0421]   BP Pulse Resp Temp SpO2   (!) 192/86 98 16 98.3 °F (36.8 °C) 95 %      MAP       --         Physical Exam    Nursing note and vitals reviewed.  Constitutional: She appears well-developed and well-nourished. No distress.   HENT:   Head: Normocephalic and atraumatic.   Eyes: EOM are normal. Pupils are equal, round, and reactive to light.   Neck: Normal range of motion. Neck supple.   Cardiovascular: Regular rhythm, normal heart sounds and intact distal pulses. Exam reveals no gallop and no friction rub.    No murmur heard.  Tachycardic.    Pulmonary/Chest: Breath sounds normal. No respiratory distress. She has no wheezes. She has no rhonchi. She has no rales.   Abdominal: Soft. She exhibits no distension. There is no tenderness. There is no rebound and no guarding.   Musculoskeletal: Normal range of motion.   Neurological: She is alert and oriented to person, place, and time. She has normal strength. No cranial nerve deficit or sensory deficit.   Skin: Skin is warm and dry. No rash noted.   Psychiatric:   Patient is depressed and anxious. Positive for suicidal ideation.          ED Course   Procedures  Labs Reviewed   CBC W/ AUTO DIFFERENTIAL - Abnormal; Notable for the following components:       Result Value    Platelets 422 (*)     Gran # (ANC) 9.7 (*)     Gran% 86.8 (*)     Lymph% 9.4 (*)     Mono% 2.8 (*)     All other components within normal limits   COMPREHENSIVE METABOLIC PANEL - Abnormal; Notable for the following components:    CO2 21 (*)     Glucose 117 (*)     Calcium 10.8 (*)     All other components within normal limits   TSH - Abnormal; Notable for the following components:    TSH 0.186 (*)     All other  components within normal limits   URINALYSIS, REFLEX TO URINE CULTURE - Abnormal; Notable for the following components:    Leukocytes, UA 3+ (*)     All other components within normal limits    Narrative:     Preferred Collection Type->Urine, Clean Catch   ACETAMINOPHEN LEVEL - Abnormal; Notable for the following components:    Acetaminophen (Tylenol), Serum <3.0 (*)     All other components within normal limits   SALICYLATE LEVEL - Abnormal; Notable for the following components:    Salicylate Lvl <5.0 (*)     All other components within normal limits   URINALYSIS MICROSCOPIC - Abnormal; Notable for the following components:    WBC, UA 27 (*)     All other components within normal limits    Narrative:     Preferred Collection Type->Urine, Clean Catch   CULTURE, URINE    Narrative:     Preferred Collection Type->Urine, Clean Catch   DRUG SCREEN PANEL, URINE EMERGENCY    Narrative:     Preferred Collection Type->Urine, Clean Catch   ALCOHOL,MEDICAL (ETHANOL)   LIPASE   T4, FREE   POCT URINE PREGNANCY          Imaging Results    None          Medical Decision Making:   History:   Old Medical Records: I decided to obtain old medical records.  Initial Assessment:   Patient with a history of depression, now with suicidal ideation. Additionally she stopped taking her klonopin 2 days ago and is in moderate withdrawal. Will give her klonopin. Will PEC, obtain medical clearance, and transfer to psychiatric facility.   Differential Diagnosis:   My initial differential diagnoses include but are not limited to: depression, suicidal ideation, benzodiazapine withdrawal, hyperthyroidism, substance abuse.   Clinical Tests:   Lab Tests: Ordered and Reviewed  Other:   I have discussed this case with another health care provider.            Scribe Attestation:   Scribe #1: I performed the above scribed service and the documentation accurately describes the services I performed. I attest to the accuracy of the note.    Attending  Attestation:             Attending ED Notes:   Patient's laboratories unremarkable. She feels much better after klonopin administration and has no sign of withdrawal at this time. She was found to have a UTI and will start on Macrobid. Patient is medically clear and involuntarily committed. Will transfer to psychiatric facility.              Clinical Impression:       ICD-10-CM ICD-9-CM   1. Suicidal ideation R45.851 V62.84   2. Depression, unspecified depression type F32.9 311   3. Benzodiazepine withdrawal without complication F13.230 292.0     304.10   4. Urinary tract infection without hematuria, site unspecified N39.0 599.0         Disposition:   Disposition: Transferred               I, Dr. Ant Key III, personally performed the services described in this documentation. All medical record entries made by the scribe were at my direction and in my presence.  I have reviewed the chart and agree that the record reflects my personal performance and is accurate and complete. Ant Key III, MD.  4:01 PM 05/25/2019           Ant Key III, MD  05/25/19 8448

## 2019-05-25 LAB — BACTERIA UR CULT: NORMAL

## 2020-03-22 ENCOUNTER — HOSPITAL ENCOUNTER (EMERGENCY)
Facility: HOSPITAL | Age: 55
Discharge: HOME | End: 2020-03-22
Attending: EMERGENCY MEDICINE
Payer: MEDICAID

## 2020-03-22 VITALS
RESPIRATION RATE: 18 BRPM | SYSTOLIC BLOOD PRESSURE: 120 MMHG | TEMPERATURE: 98 F | HEIGHT: 65 IN | WEIGHT: 220 LBS | HEART RATE: 88 BPM | BODY MASS INDEX: 36.65 KG/M2 | DIASTOLIC BLOOD PRESSURE: 77 MMHG | OXYGEN SATURATION: 98 %

## 2020-03-22 DIAGNOSIS — K59.00 CONSTIPATION, UNSPECIFIED CONSTIPATION TYPE: ICD-10-CM

## 2020-03-22 DIAGNOSIS — R91.1 LUNG NODULE: ICD-10-CM

## 2020-03-22 DIAGNOSIS — K80.81 BILIARY CALCULUS OF OTHER SITE WITH OBSTRUCTION: ICD-10-CM

## 2020-03-22 DIAGNOSIS — R10.31 RECURRENT RIGHT LOWER QUADRANT ABDOMINAL PAIN: Primary | ICD-10-CM

## 2020-03-22 LAB
ALBUMIN SERPL BCP-MCNC: 3.8 G/DL (ref 3.5–5.2)
ALP SERPL-CCNC: 90 U/L (ref 55–135)
ALT SERPL W/O P-5'-P-CCNC: 27 U/L (ref 10–44)
ANION GAP SERPL CALC-SCNC: 10 MMOL/L (ref 8–16)
AST SERPL-CCNC: 27 U/L (ref 10–40)
BASOPHILS # BLD AUTO: 0.13 K/UL (ref 0–0.2)
BASOPHILS NFR BLD: 1.8 % (ref 0–1.9)
BILIRUB SERPL-MCNC: 0.2 MG/DL (ref 0.1–1)
BILIRUB UR QL STRIP: NEGATIVE
BUN SERPL-MCNC: 15 MG/DL (ref 6–20)
CALCIUM SERPL-MCNC: 9.7 MG/DL (ref 8.7–10.5)
CHLORIDE SERPL-SCNC: 103 MMOL/L (ref 95–110)
CLARITY UR REFRACT.AUTO: CLEAR
CO2 SERPL-SCNC: 24 MMOL/L (ref 23–29)
COLOR UR AUTO: NORMAL
CREAT SERPL-MCNC: 1 MG/DL (ref 0.5–1.4)
DIFFERENTIAL METHOD: ABNORMAL
EOSINOPHIL # BLD AUTO: 0.4 K/UL (ref 0–0.5)
EOSINOPHIL NFR BLD: 5.1 % (ref 0–8)
ERYTHROCYTE [DISTWIDTH] IN BLOOD BY AUTOMATED COUNT: 12.7 % (ref 11.5–14.5)
EST. GFR  (AFRICAN AMERICAN): >60 ML/MIN/1.73 M^2
EST. GFR  (NON AFRICAN AMERICAN): >60 ML/MIN/1.73 M^2
GLUCOSE SERPL-MCNC: 100 MG/DL (ref 70–110)
GLUCOSE UR QL STRIP: NEGATIVE
HCT VFR BLD AUTO: 40.1 % (ref 37–48.5)
HGB BLD-MCNC: 12.7 G/DL (ref 12–16)
HGB UR QL STRIP: NEGATIVE
IMM GRANULOCYTES # BLD AUTO: 0.02 K/UL (ref 0–0.04)
IMM GRANULOCYTES NFR BLD AUTO: 0.3 % (ref 0–0.5)
KETONES UR QL STRIP: NEGATIVE
LEUKOCYTE ESTERASE UR QL STRIP: NEGATIVE
LIPASE SERPL-CCNC: 40 U/L (ref 4–60)
LYMPHOCYTES # BLD AUTO: 2.5 K/UL (ref 1–4.8)
LYMPHOCYTES NFR BLD: 34.7 % (ref 18–48)
MCH RBC QN AUTO: 29.1 PG (ref 27–31)
MCHC RBC AUTO-ENTMCNC: 31.7 G/DL (ref 32–36)
MCV RBC AUTO: 92 FL (ref 82–98)
MONOCYTES # BLD AUTO: 0.6 K/UL (ref 0.3–1)
MONOCYTES NFR BLD: 8.7 % (ref 4–15)
NEUTROPHILS # BLD AUTO: 3.6 K/UL (ref 1.8–7.7)
NEUTROPHILS NFR BLD: 49.4 % (ref 38–73)
NITRITE UR QL STRIP: NEGATIVE
NRBC BLD-RTO: 0 /100 WBC
PH UR STRIP: 7 [PH] (ref 5–8)
PLATELET # BLD AUTO: 372 K/UL (ref 150–350)
PMV BLD AUTO: 9.8 FL (ref 9.2–12.9)
POTASSIUM SERPL-SCNC: 4.9 MMOL/L (ref 3.5–5.1)
PROT SERPL-MCNC: 7.7 G/DL (ref 6–8.4)
PROT UR QL STRIP: NEGATIVE
RBC # BLD AUTO: 4.36 M/UL (ref 4–5.4)
SODIUM SERPL-SCNC: 137 MMOL/L (ref 136–145)
SP GR UR STRIP: 1 (ref 1–1.03)
URN SPEC COLLECT METH UR: NORMAL
WBC # BLD AUTO: 7.27 K/UL (ref 3.9–12.7)

## 2020-03-22 PROCEDURE — 25500020 PHARM REV CODE 255: Performed by: EMERGENCY MEDICINE

## 2020-03-22 PROCEDURE — 80047 BASIC METABLC PNL IONIZED CA: CPT | Mod: 59

## 2020-03-22 PROCEDURE — 81003 URINALYSIS AUTO W/O SCOPE: CPT

## 2020-03-22 PROCEDURE — 99284 EMERGENCY DEPT VISIT MOD MDM: CPT | Mod: ,,, | Performed by: EMERGENCY MEDICINE

## 2020-03-22 PROCEDURE — 99285 EMERGENCY DEPT VISIT HI MDM: CPT | Mod: 25

## 2020-03-22 PROCEDURE — 83690 ASSAY OF LIPASE: CPT

## 2020-03-22 PROCEDURE — 80053 COMPREHEN METABOLIC PANEL: CPT

## 2020-03-22 PROCEDURE — 85025 COMPLETE CBC W/AUTO DIFF WBC: CPT

## 2020-03-22 PROCEDURE — 99284 PR EMERGENCY DEPT VISIT,LEVEL IV: ICD-10-PCS | Mod: ,,, | Performed by: EMERGENCY MEDICINE

## 2020-03-22 PROCEDURE — 25000003 PHARM REV CODE 250: Performed by: EMERGENCY MEDICINE

## 2020-03-22 RX ORDER — POLYETHYLENE GLYCOL 3350 17 G/17G
17 POWDER, FOR SOLUTION ORAL DAILY
Qty: 30 EACH | Refills: 0 | Status: SHIPPED | OUTPATIENT
Start: 2020-03-22

## 2020-03-22 RX ORDER — HYDROCODONE BITARTRATE AND ACETAMINOPHEN 5; 325 MG/1; MG/1
1 TABLET ORAL
Status: COMPLETED | OUTPATIENT
Start: 2020-03-22 | End: 2020-03-22

## 2020-03-22 RX ORDER — DOCUSATE SODIUM 100 MG/1
100 CAPSULE, LIQUID FILLED ORAL 2 TIMES DAILY
Qty: 20 CAPSULE | Refills: 0 | Status: SHIPPED | OUTPATIENT
Start: 2020-03-22 | End: 2020-04-01

## 2020-03-22 RX ADMIN — HYDROCODONE BITARTRATE AND ACETAMINOPHEN 1 TABLET: 5; 325 TABLET ORAL at 03:03

## 2020-03-22 RX ADMIN — IOHEXOL 100 ML: 350 INJECTION, SOLUTION INTRAVENOUS at 04:03

## 2020-03-22 NOTE — ED PROVIDER NOTES
Encounter Date: 3/22/2020       History     Chief Complaint   Patient presents with    Abdominal Pain     PT with RUQ ABD, right flank pain, ABD distention and increased gas x 6 months. PT has had full work up by PCP and  ED. PT is waiting to hear from specialty clinic.      HPI   Ms. Apple is a 54 y.o. female with h/o PE and recurrent abd pain here today with RLQ abd pain. Reports for the past 6 months she has had worsening intermittent right lower quadrant abdominal pain.  This pain does not radiate.  Has been seen by her PCP at [facility] where she sounds to have had significant work up. Taken tramadol which does help. Has been advised that she may need her GB out and sounds to have been referred to GI here, but does not yet have an appt. Last BM was today. No melena or hematochezia. Denies fevers, chills, n/v, chest pain, SOB.      Review of patient's allergies indicates:   Allergen Reactions    Compazine [prochlorperazine edisylate]     Phenergan plain      Past Medical History:   Diagnosis Date    Asthma     Broken femur     Hashimoto's disease     Pulmonary embolism      Past Surgical History:   Procedure Laterality Date    EYE SURGERY      FRACTURE SURGERY      HYSTERECTOMY      KNEE SURGERY       History reviewed. No pertinent family history.  Social History     Tobacco Use    Smoking status: Former Smoker     Types: Cigarettes    Smokeless tobacco: Never Used   Substance Use Topics    Alcohol use: No    Drug use: No     Review of Systems   Constitutional: Negative for fatigue and fever.   HENT: Negative for sore throat.    Respiratory: Negative for cough and shortness of breath.    Cardiovascular: Negative for chest pain.   Gastrointestinal: Positive for abdominal pain. Negative for abdominal distention, constipation, diarrhea, nausea and vomiting.   Genitourinary: Negative for dysuria.   Musculoskeletal: Negative for back pain.   Skin: Negative for rash.   Neurological: Negative for  weakness.   Hematological: Does not bruise/bleed easily.       Physical Exam     Initial Vitals [03/22/20 0244]   BP Pulse Resp Temp SpO2   121/69 90 12 97.8 °F (36.6 °C) (!) 93 %      MAP       --         Physical Exam    Nursing note and vitals reviewed.  Constitutional: She appears well-developed and well-nourished. She is not diaphoretic. No distress.   HENT:   Head: Normocephalic and atraumatic.   Right Ear: External ear normal.   Left Ear: External ear normal.   Eyes: No scleral icterus.   Neck: Neck supple.   Cardiovascular: Normal rate, regular rhythm, normal heart sounds and intact distal pulses.   Pulmonary/Chest: Breath sounds normal. No respiratory distress. She has no wheezes. She has no rhonchi. She has no rales.   Abdominal: Soft. She exhibits no distension. There is no tenderness. There is no rebound and no guarding.   Negative Rovsing's. Negative obturator. Negative Gomez's.   Neurological: She is alert and oriented to person, place, and time. GCS score is 15. GCS eye subscore is 4. GCS verbal subscore is 5. GCS motor subscore is 6.   Skin: Skin is warm. Capillary refill takes less than 2 seconds. No rash noted.   Psychiatric: She has a normal mood and affect.         ED Course   Procedures  Labs Reviewed   CBC W/ AUTO DIFFERENTIAL - Abnormal; Notable for the following components:       Result Value    Mean Corpuscular Hemoglobin Conc 31.7 (*)     Platelets 372 (*)     All other components within normal limits   COMPREHENSIVE METABOLIC PANEL   LIPASE   URINALYSIS, REFLEX TO URINE CULTURE    Narrative:     Preferred Collection Type->Urine, Clean Catch          Imaging Results          CT Abdomen Pelvis With Contrast (Final result)  Result time 03/22/20 04:33:19    Final result by Riky Donaldson MD (03/22/20 04:33:19)                 Impression:      1.  No CT evidence of acute intra-abdominal abnormality.    2.  Possible cholelithiasis.    3.  3-mm left lower lobe pulmonary micronodule.  For a  solid nodule <6 mm, Fleischner Society 2017 guidelines recommend no routine follow up for a low risk patient, or follow-up with non-contrast chest CT at 12 months in a high risk patient.    4.  Moderate volume retained stool throughout the colon which may relate to constipation.    5.  Additional incidental findings as above.      Electronically signed by: Riky Donaldson MD  Date:    03/22/2020  Time:    04:33             Narrative:    EXAMINATION:  CT ABDOMEN PELVIS WITH CONTRAST    CLINICAL HISTORY:  RLQ pain, appendicitis suspected;    TECHNIQUE:  Low dose axial images, sagittal and coronal reformations were obtained from the lung bases to the pubic symphysis following the IV administration of 100 mL of Omnipaque 350 .  Oral contrast was not given.    COMPARISON:  None.    FINDINGS:  There is a 0.3 cm left lower lobe pulmonary micronodule present.  No confluent airspace consolidation or pleural fluid at the visualized lung bases.  The visualized portions of the heart appear normal.    The liver is at the upper limits of normal for size.  No focal hepatic abnormality identified.  The portal vein is patent.  Possible tiny layering calculi noted within the gallbladder fundus.  There is no intra-or extrahepatic biliary ductal dilatation.    The stomach is mildly distended with recently ingested oral contents.  The spleen, pancreas, and adrenal glands appear within normal limits.    The kidneys are normal in size and location and enhance symmetrically.  There is no evidence of hydronephrosis. The urinary bladder is unremarkable. The uterus is surgically absent.  The adnexa appear within normal limits.  No significant free fluid within the pelvis.    The abdominal aorta is normal in course and caliber with mild atherosclerotic calcification along its course.  There is no retroperitoneal hematoma.    The visualized loops of small and large bowel show no evidence of obstruction or inflammation.  The appendix is not  visualized, however no focal inflammatory change is seen at its expected location.  There is a moderate volume retained stool throughout the colon which may relate to constipation.  There is no ascites, portal venous gas, or free intraperitoneal air.    The osseous structures demonstrate mild degenerative changes of the spine.  There is partially visualized internal fixation hardware spanning the proximal right femur.  The extraperitoneal soft tissues are unremarkable.                                 Medical Decision Making:   History:   Old Medical Records: I decided to obtain old medical records.  Old Records Summarized: records from another hospital.       <> Summary of Records: Seen at ; had normal labs and US with cholelithiasis without evidence of choledocholithiasis or cholecystitis.  Was advised to follow-up with general surgeon.  Clinical Tests:   Lab Tests: Ordered and Reviewed  Radiological Study: Ordered and Reviewed  ED Management:  Vitals normal. Afebrile. Here w/ recurrent RLQ abd pain x 6 months. Not particularly tender on my exam, but does appear uncomfortable.  No scleral icterus on exam.   review with multiple narcotic prescriptions this month.  Will need to rule out cholecystitis, appendicitis, obstructive renal stone.  For completeness sake, we will obtain CBC, CMP, UA, lipase, CT abdomen pelvis.  Norco given for pain here.    Labs grossly within normal limits without actionable values.  The CT scan shows cholelithiasis without evidence of cholecystitis.  Has significant stool burden.  At suspect symptoms likely related to constipation more so than symptomatic cholelithiasis is specially since pain was located in the right lower quadrant. Repeat abd exam continues to be benign.    Will place on MiraLax and Colace; Rx provided.    Advised patient that this appears to be more chronic pain type issue and I will not be prescribing any narcotics today.  I stated that it appears patient has had  multiple narcotic prescriptions this month.  She replied that she has only had a prescription for tramadol from [facility].  However, her  does not support her claim (screen shot below), which I explained to her. This angered patient. It is possible that she could be here for narcotic prescription, but this is unclear.     Referred to general surgery for possible outpt elective cholecystectomy.   Stable for discharge at this time. Return precautions discussed.                                    Clinical Impression:       ICD-10-CM ICD-9-CM   1. Recurrent right lower quadrant abdominal pain R10.31 789.03   2. Biliary calculus of other site with obstruction K80.81 574.21   3. Constipation, unspecified constipation type K59.00 564.00   4. Lung nodule R91.1 793.11             ED Disposition Condition    Discharge Stable        ED Prescriptions     Medication Sig Dispense Start Date End Date Auth. Provider    polyethylene glycol (GLYCOLAX) 17 gram PwPk Take 17 g by mouth once daily. 30 each 3/22/2020  Dagoberto Marvin MD    docusate sodium (COLACE) 100 MG capsule Take 1 capsule (100 mg total) by mouth 2 (two) times daily. for 10 days 20 capsule 3/22/2020 4/1/2020 Dagoberto Marvin MD        Follow-up Information    None                                    Dagoberto Marvin MD  03/23/20 0039

## 2020-03-22 NOTE — ED TRIAGE NOTES
Pt presented to ED with complaints of generalized abdominal pain 8/10 at this time. Pt states she has been having pain for 6 months. Was seen at [facility] last week and was told that she would need surgery for gall bladder removal. Was sent home with tramadol prescription for pain. Pt says it does not help . Pt denies n/v/d . Pt denies chest pain. Pt denies respiratory symptoms.    Pt aaoX4 and ambulatory.      Psychosocial:  Patient is calm and cooperative.  Patients insight and judgement are appropriate to situation.  Appears clean, well maintained, with clothing appropriate to environment.  No evidence of delusions, hallucinations, or psychosis.     Neuro:  Eyes open spontaneously.  Awake, alert, oriented x 4.  Speech clear and appropriate.  Tolerating saliva secretions well.  Able to follow commands, demonstrating ability to actively and appropriately communicate within context of current conversation.  Symmetrical facial muscles.  Moving all extremities well with no noted weakness.  Adequate muscle tone present.    Movement is purposeful.  No evidence of impaired sensation.  Response to external stimuli appropriately.  No noted drifts.     Airway:  Bilateral chest rise and fall.  RR regular and non-labored.  Air entry patent with and clear x 5 lobes of the lungs.  No crepitus or subcutaneous emphysema noted on palpation.       Circulatory:  Skin warm, dry, and pink.  Apical and radial pulses strong and regular.  Capillary refill/skin blanching less than 3 seconds to distal of 4 extremities.     Abdomen:  Abdomen soft and non-distended.  Positive normo-active bowel sounds x 4 quadrants.       Urinary: Denies pressure, frequency, urgency, odor or pain.     Extremities:  No redness, heat, deformity, or pain.     Skin:  Intact with no bruising/discolorations noted.

## 2020-03-22 NOTE — ED NOTES
"Pt refused discharge prescriptions stating "im not going to get those filled, the doctor thinks im seeking drugs, this was all just a big waste of time for me"   "

## 2020-03-23 LAB
BUN SERPL-MCNC: 16 MG/DL (ref 6–30)
CHLORIDE SERPL-SCNC: 103 MMOL/L (ref 95–110)
CREAT SERPL-MCNC: 0.9 MG/DL (ref 0.5–1.4)
GLUCOSE SERPL-MCNC: 104 MG/DL (ref 70–110)
HCT VFR BLD CALC: 37 %PCV (ref 36–54)
POC IONIZED CALCIUM: 1.1 MMOL/L (ref 1.06–1.42)
POC TCO2 (MEASURED): 27 MMOL/L (ref 23–29)
POTASSIUM BLD-SCNC: 4.6 MMOL/L (ref 3.5–5.1)
SAMPLE: NORMAL
SODIUM BLD-SCNC: 137 MMOL/L (ref 136–145)

## 2020-04-27 ENCOUNTER — TELEPHONE (OUTPATIENT)
Dept: SURGERY | Facility: CLINIC | Age: 55
End: 2020-04-27

## 2020-04-27 NOTE — TELEPHONE ENCOUNTER
----- Message from Alise Michele sent at 4/27/2020  3:55 PM CDT -----  Contact: Pt  Reason: Pt stated she missed a call regard to getting appt but no message was left so not sure who called.    Communication:[phone]

## 2020-04-27 NOTE — TELEPHONE ENCOUNTER
Call pt to tried to get [facility] set up to do a virtual visit. Pt refused. Pt stated she will call [facility].  Pt was informed that if she needed us she can call us at [phone]. Pt verbilizied understanding.

## 2020-05-25 ENCOUNTER — TELEPHONE (OUTPATIENT)
Dept: GASTROENTEROLOGY | Facility: CLINIC | Age: 55
End: 2020-05-25

## 2020-05-25 NOTE — TELEPHONE ENCOUNTER
----- Message from Jennifer Woodruff MD sent at 5/22/2020  2:38 PM CDT -----  This pt just got scheduled in [place] for Monday, but lives in [place] (Medicaid).  Please see if she would rather see NP here in [place] as it is closer.

## 2021-12-15 NOTE — ADDENDUM NOTE
Addended by: FATEMEH HORTON on: 3/22/2018 09:14 AM     Modules accepted: Orders     4 = No assist / stand by assistance

## 2023-09-24 NOTE — DISCHARGE INSTRUCTIONS
Take antibiotics and steroid pack to completion. Follow up with PCP for regular asthma care and symptom management. Drink plenty of fluids over the next few days. Take daily Zyrtec, Allegra or Claritin to reduce sinus congestion. Take saline nasal spray regularly to help reduce sinus congestion. Return to the ED immediately if you develop shortness of breath, wheezing despite asthma treatment, fever or chills.    Our goal in the emergency department is to always give you outstanding care and exceptional service. You may receive a survey by mail or e-mail in the next week regarding your experience in our ED. We would greatly appreciate your completing and returning the survey. Your feedback provides us with a way to recognize our staff who give very good care and it helps us learn how to improve when your experience was below our aspiration of excellence.      no